# Patient Record
Sex: FEMALE | Race: WHITE | NOT HISPANIC OR LATINO | Employment: OTHER | URBAN - METROPOLITAN AREA
[De-identification: names, ages, dates, MRNs, and addresses within clinical notes are randomized per-mention and may not be internally consistent; named-entity substitution may affect disease eponyms.]

---

## 2021-08-08 ENCOUNTER — OFFICE VISIT (OUTPATIENT)
Dept: URGENT CARE | Facility: URGENT CARE | Age: 76
End: 2021-08-08
Payer: MEDICARE

## 2021-08-08 ENCOUNTER — ANCILLARY PROCEDURE (OUTPATIENT)
Dept: GENERAL RADIOLOGY | Facility: CLINIC | Age: 76
End: 2021-08-08
Attending: FAMILY MEDICINE
Payer: MEDICARE

## 2021-08-08 VITALS
DIASTOLIC BLOOD PRESSURE: 94 MMHG | HEART RATE: 114 BPM | TEMPERATURE: 96.6 F | OXYGEN SATURATION: 96 % | RESPIRATION RATE: 16 BRPM | SYSTOLIC BLOOD PRESSURE: 158 MMHG

## 2021-08-08 DIAGNOSIS — R05.9 COUGH: ICD-10-CM

## 2021-08-08 DIAGNOSIS — J18.9 PNEUMONIA OF LEFT LOWER LOBE DUE TO INFECTIOUS ORGANISM: Primary | ICD-10-CM

## 2021-08-08 DIAGNOSIS — R06.02 SOB (SHORTNESS OF BREATH): ICD-10-CM

## 2021-08-08 DIAGNOSIS — Z20.822 SUSPECTED COVID-19 VIRUS INFECTION: ICD-10-CM

## 2021-08-08 LAB — SARS-COV-2 RNA RESP QL NAA+PROBE: POSITIVE

## 2021-08-08 PROCEDURE — 99204 OFFICE O/P NEW MOD 45 MIN: CPT | Performed by: FAMILY MEDICINE

## 2021-08-08 PROCEDURE — 71046 X-RAY EXAM CHEST 2 VIEWS: CPT | Performed by: RADIOLOGY

## 2021-08-08 PROCEDURE — U0005 INFEC AGEN DETEC AMPLI PROBE: HCPCS | Performed by: FAMILY MEDICINE

## 2021-08-08 PROCEDURE — U0003 INFECTIOUS AGENT DETECTION BY NUCLEIC ACID (DNA OR RNA); SEVERE ACUTE RESPIRATORY SYNDROME CORONAVIRUS 2 (SARS-COV-2) (CORONAVIRUS DISEASE [COVID-19]), AMPLIFIED PROBE TECHNIQUE, MAKING USE OF HIGH THROUGHPUT TECHNOLOGIES AS DESCRIBED BY CMS-2020-01-R: HCPCS | Performed by: FAMILY MEDICINE

## 2021-08-08 RX ORDER — SERTRALINE HYDROCHLORIDE 25 MG/1
TABLET, FILM COATED ORAL
COMMUNITY
Start: 2020-10-27

## 2021-08-08 RX ORDER — CHLORTHALIDONE 25 MG/1
TABLET ORAL
COMMUNITY
Start: 2020-09-29

## 2021-08-08 RX ORDER — SIMVASTATIN 20 MG
TABLET ORAL
COMMUNITY
Start: 2021-01-25

## 2021-08-08 RX ORDER — AMOXICILLIN 875 MG
875 TABLET ORAL 2 TIMES DAILY
Qty: 20 TABLET | Refills: 0 | Status: SHIPPED | OUTPATIENT
Start: 2021-08-08 | End: 2021-08-18

## 2021-08-08 RX ORDER — DOXYCYCLINE 100 MG/1
100 TABLET ORAL 2 TIMES DAILY
Qty: 20 TABLET | Refills: 0 | Status: SHIPPED | OUTPATIENT
Start: 2021-08-08 | End: 2021-08-18

## 2021-08-08 RX ORDER — PANTOPRAZOLE SODIUM 40 MG/1
TABLET, DELAYED RELEASE ORAL
COMMUNITY
Start: 2021-02-22

## 2021-08-08 NOTE — PROGRESS NOTES
SUBJECTIVE: Cat Shea is a 76 year old female presenting with a chief complaint of fever, cough  and sweats with fatigue and sob.  Onset of symptoms was 1 week(s) ago.  Course of illness is worsening.    Predisposing factors include pt is immunized for COVID.    No past medical history on file.  Allergies   Allergen Reactions     Codeine      Other reaction(s): GI intolerance     Levofloxacin Swelling     Morphine      Other reaction(s): GI intolerance     Social History     Tobacco Use     Smoking status: Never Smoker     Smokeless tobacco: Never Used   Substance Use Topics     Alcohol use: Not on file       ROS:  SKIN: no rash  GI: no vomiting    OBJECTIVE:  BP (!) 158/94   Pulse 114   Temp (!) 96.6  F (35.9  C) (Tympanic)   Resp 16   SpO2 96% GENERAL APPEARANCE: healthy, alert and no distress  EYES: EOMI,  PERRL, conjunctiva clear  HENT: ear canals and TM's normal.  Nose and mouth without ulcers, erythema or lesions  RESP: rhonchi L lower posterior  CV: regular rates and rhythm, normal S1 S2, no murmur noted  SKIN: no suspicious lesions or rashes    Xray without acute findings, no pneumonia seen on cxr read by Nirav Carlson D.O.      ICD-10-CM    1. Pneumonia of left lower lobe due to infectious organism  J18.9 doxycycline monohydrate (ADOXA) 100 MG tablet     amoxicillin (AMOXIL) 875 MG tablet   2. Cough  R05 XR Chest 2 Views   3. Suspected COVID-19 virus infection  Z20.822 Symptomatic COVID-19 Virus (Coronavirus) by PCR Nasopharyngeal     XR Chest 2 Views   4. SOB (shortness of breath)  R06.02      Quarantine  PPE used  No agp  Pt masked  Fluids/Rest, f/u if worse/not any better

## 2021-08-09 ENCOUNTER — TELEPHONE (OUTPATIENT)
Dept: URGENT CARE | Facility: URGENT CARE | Age: 76
End: 2021-08-09

## 2021-08-09 NOTE — TELEPHONE ENCOUNTER
"-Coronavirus (COVID-19) Notification    Caller Name (Patient, parent, daughter/son, grandparent, etc)Patient  Patient    Reason for call  Notify of Positive Coronavirus (COVID-19) lab results, assess symptoms,  review [facility] recommendations    Lab Result    Lab test:  2019-nCoV rRt-PCR or SARS-CoV-2 PCR    Oropharyngeal AND/OR nasopharyngeal swabs is POSITIVE for 2019-nCoV RNA/SARS-COV-2 PCR (COVID-19 virus)    RN Recommendations/Instructions per [facility] Coronavirus COVID-19 recommendations    Brief introduction script  Introduce self then review script:  \"I am calling on behalf of [employer].  We were notified that your Coronavirus test (COVID-19) for was POSITIVE for the virus.  I have some information to relay to you but first I wanted to mention that the [place] Dept of Health will be contacting you shortly [it's possible MD already called Patient] to talk to you more about how you are feeling and other people you have had contact with who might now also have the virus.  Also, [facility] is Partnering with the [facility] for Covid-19 research, you may be contacted directly by research staff.\"    Assessment (Inquire about Patient's current symptoms)   Assessment   Current Symptoms at time of phone call: (if no symptoms, document No symptoms] Cough, congestion, nausea   8/1/21 8/1/21     If at time of call, Patients symptoms hare worsened, the Patient should contact 911 or have someone drive them to Emergency Dept promptly:      If Patient calling 911, inform 911 personal that you have tested positive for the Coronavirus (COVID-19).  Place mask on and await 911 to arrive.    If Emergency Dept, If possible, please have another adult drive you to the Emergency Dept but you need to wear mask when in contact with other people.      Monoclonal Antibody Administration    You may be eligible to receive a new treatment with a monoclonal antibody for preventing hospitalization in " "patients at high risk for complications from COVID-19.   This medication is still experimental and available on a limited basis; it is given through an IV and must be given at an infusion center. Please note that not all people who are eligible will receive the medication since it is in limited supply.     Are you interested in being considered for this medication?  Yes.   Is the patient symptomatic?  Yes. Is the patient 18 years of age or older? Yes.  Is the patient newly (within the last week) on supplemental oxygen or requiring more oxygen than usual?  No. Patient criteria for selection: Is the patients weight equal to or greater than 40 kg (88 lbs)? Yes.  Is the patient's age 65 years or older?  Yes.  Patient qualifies, refer patient to [facility].   Does the patient fit the criteria: Yes: Patient referred [place], patient or family/friend will complete application.    If patient qualifies based on above criteria:  \"You will be contacted if you are selected to receive this treatment in the next 1-2 business days.   This is time sensitive and if you are not selected in the next 1-2 business days, you will not receive the medication.  If you do not receive a call to schedule, you have not been selected.\"      Review information with Patient    Your result was positive. This means you have COVID-19 (coronavirus).  We have sent you a letter that reviews the information that I'll be reviewing with you now.    How can I protect others?    If you have symptoms: stay home and away from others (self-isolate) until:    You've had no fever--and no medicine that reduces fever--for 1 full day (24 hours). And       Your other symptoms have gotten better. For example, your cough or breathing has improved. And     At least 10 days have passed since your symptoms started. (If you've been told by a doctor that you have a weak immune system, wait 20 days.)     If you don't have symptoms: Stay home and away from others " (self-isolate) until at least 10 days have passed since your first positive COVID-19 test. (Date test collected)    During this time:    Stay in your own room, including for meals. Use your own bathroom if you can.    Stay away from others in your home. No hugging, kissing or shaking hands. No visitors.     Don't go to work, school or anywhere else.     Clean  high touch  surfaces often (doorknobs, counters, handles, etc.). Use a household cleaning spray or wipes. You'll find a full list on the EPA website at www.epa.gov/pesticide-registration/list-n-disinfectants-use-against-sars-cov-2.     Cover your mouth and nose with a mask, tissue or other face covering to avoid spreading germs.    Wash your hands and face often with soap and water.    Make a list of people you have been in close contact with recently, even if either of you wore a face covering.   ; Start your list from 2 days before you became ill or had a positive test.  ; Include anyone that was within 6 feet of you for a cumulative total of 15 minutes or more in 24 hours. (Example: if you sat next to Ricardo for 5 minutes in the morning and 10 minutes in the afternoon, then you were in close contact for 15 minutes total that day. Ricardo would be added to your list.)    A public health worker will call or text you. It is important that you answer. They will ask you questions about possible exposures to COVID-19, such as people you have been in direct contact with and places you have visited.    Tell the people on your list that you have COVID-19; they should stay away from others for 14 days starting from the last time they were in contact with you (unless you are told something different from a public health worker).     Caregivers in these groups are at risk for severe illness due to COVID-19:  o People 65 years and older  o People who live in a nursing home or long-term care facility  o People with chronic disease (lung, heart, cancer, diabetes, kidney,  liver, immunologic)  o People who have a weakened immune system, including those who:  - Are in cancer treatment  - Take medicine that weakens the immune system, such as corticosteroids  - Had a bone marrow or organ transplant  - Have an immune deficiency  - Have poorly controlled HIV or AIDS  - Are obese (body mass index of 40 or higher)  - Smoke regularly    Caregivers should wear gloves while washing dishes, handling laundry and cleaning bedrooms and bathrooms.    Wash and dry laundry with special caution. Don't shake dirty laundry, and use the warmest water setting you can.    If you have a weakened immune system, ask your doctor about other actions you should take.    For more tips, go to www.cdc.gov/coronavirus/2019-ncov/downloads/10Things.pdf.    You should not go back to work until you meet the guidelines above for ending your home isolation. You don't need to be retested for COVID-19 before going back to work--studies show that you won't spread the virus if it's been at least 10 days since your symptoms started (or 20 days, if you have a weak immune system).    Employers: This document serves as formal notice of your employee's medical guidelines for going back to work. They must meet the above guidelines before going back to work in person.    How can I take care of myself?    1. Get lots of rest. Drink extra fluids (unless a doctor has told you not to).    2. Take Tylenol (acetaminophen) for fever or pain. If you have liver or kidney problems, ask your family doctor if it's okay to take Tylenol.     Take either:     650 mg (two 325 mg pills) every 4 to 6 hours, or     1,000 mg (two 500 mg pills) every 8 hours as needed.     Note: Don't take more than 3,000 mg in one day. Acetaminophen is found in many medicines (both prescribed and over-the-counter medicines). Read all labels to be sure you don't take too much.    For children, check the Tylenol bottle for the right dose (based on their age or  weight).    3. If you have other health problems (like cancer, heart failure, an organ transplant or severe kidney disease): Call your specialty clinic if you don't feel better in the next 2 days.    4. Know when to call 911: Emergency warning signs include:    Trouble breathing or shortness of breath    Pain or pressure in the chest that doesn't go away    Feeling confused like you haven't felt before, or not being able to wake up    Bluish-colored lips or face    5. Sign up for [employer]. We know it's scary to hear that you have COVID-19. We want to track your symptoms to make sure you're okay over the next 2 weeks. Please look for an email from [employer]--this is a free, online program that we'll use to keep in touch. To sign up, follow the link in the email. Learn more at [email or web address].    Where can I get more information?    [facility]: [email or web address]    Coronavirus Basics: www.health.Count includes the [facility].[place]./diseases/coronavirus/basics.html    What to Do If You're Sick: www.cdc.gov/coronavirus/2019-ncov/about/steps-when-sick.html    Ending Home Isolation: www.cdc.gov/coronavirus/2019-ncov/hcp/disposition-in-home-patients.html     Caring for Someone with COVID-19: www.cdc.gov/coronavirus/2019-ncov/if-you-are-sick/care-for-someone.html     [facility] clinical trials (COVID-19 research studies): clinicalaffairs.[facility]/n-clinical-trials     A Positive COVID-19 letter will be sent via [employer] or the mail. (Exception, no letters sent to Presurgerical/Preprocedure Patients)    Garima Herrera LPN

## 2021-08-12 ENCOUNTER — NURSE TRIAGE (OUTPATIENT)
Dept: NURSING | Facility: CLINIC | Age: 76
End: 2021-08-12

## 2021-08-12 NOTE — TELEPHONE ENCOUNTER
Cat is calling and has questions.  Cat is on day 5 of two antibiotics.  On 8/8/2021 diagnosed with pneumonia.  Patient is taking Amoxicillin and Doxycycline.  Positive test for covid on 8/8/2021.  Today patient has questions on medications and quarantine.    COVID 19 Nurse Triage Plan/Patient Instructions    Please be aware that novel coronavirus (COVID-19) may be circulating in the community. If you develop symptoms such as fever, cough, or SOB or if you have concerns about the presence of another infection including coronavirus (COVID-19), please contact your health care provider or visit [email or web address].     Disposition/Instructions    Home care recommended. Follow home care protocol based instructions.    Thank you for taking steps to prevent the spread of this virus.  o Limit your contact with others.  o Wear a simple mask to cover your cough.  o Wash your hands well and often.    Resources    [facility]: About COVID-19: [email or web address]    CDC: What to Do If You're Sick: www.cdc.gov/coronavirus/2019-ncov/about/steps-when-sick.html    CDC: Ending Home Isolation: www.cdc.gov/coronavirus/2019-ncov/hcp/disposition-in-home-patients.html     CDC: Caring for Someone: www.cdc.gov/coronavirus/2019-ncov/if-you-are-sick/care-for-someone.html     [facility]: Interim Guidance for Hospital Discharge to Home: www.health.[facility].[place]/diseases/coronavirus/hcp/hospdischarge.pdf    [facility] clinical trials (COVID-19 research studies): clinicalaffairs.[facility]-clinical-trials     Below are the COVID-19 hotlines at the [place] Department of Health ([facility]). Interpreters are available.   o For health questions: Call 158-415-6754 or 1-657.364.6598 (7 a.m. to 7 p.m.)  o For questions about schools and childcare: Call 105-284-4127 or 1-706.685.9475 (7 a.m. to 7 p.m.)                     Additional Information    Negative: MORE THAN A DOUBLE DOSE of a prescription or over-the-counter  (OTC) drug    Negative: DOUBLE DOSE (an extra dose or lesser amount) of over-the-counter (OTC) drug and any symptoms (e.g., dizziness, nausea, pain, sleepiness)    Negative: DOUBLE DOSE (an extra dose or lesser amount) of prescription drug and any symptoms (e.g., dizziness, nausea, pain, sleepiness)    Negative: Took another person's prescription drug    Negative: DOUBLE DOSE (an extra dose or lesser amount) of prescription drug and NO symptoms (Exception: a double dose of antibiotics)    Negative: Diabetes drug error or overdose (e.g., took wrong type of insulin or took extra dose)    Negative: Caller has medication question about med not prescribed by PCP and triager unable to answer question (e.g., compatibility with other med, storage)    Negative: Request for URGENT new prescription or refill of 'essential' medication (i.e., likelihood of harm to patient if not taken) and triager unable to fill per department policy    Negative: Prescription not at pharmacy and was prescribed today by PCP    Negative: Pharmacy calling with prescription questions and triager unable to answer question    Negative: Caller has urgent medication question about med that PCP prescribed and triager unable to answer question    Negative: Caller has NON-URGENT medication question about med that PCP prescribed and triager unable to answer question    Negative: Caller requesting a NON-URGENT new prescription or refill and triager unable to refill per department policy    Negative: DOUBLE DOSE (an extra dose or lesser amount) of over-the-counter (OTC) drug and NO symptoms    Negative: DOUBLE DOSE (an extra dose or lesser amount) of antibiotic drug and NO symptoms    Negative: Caller has medication question only, adult not sick, and triager answers question    Caller has medication question, adult has minor symptoms, caller declines triage, and triager answers question    Protocols used: MEDICATION QUESTION CALL-A-[place]

## 2021-08-15 ENCOUNTER — NURSE TRIAGE (OUTPATIENT)
Dept: NURSING | Facility: CLINIC | Age: 76
End: 2021-08-15

## 2021-08-15 NOTE — TELEPHONE ENCOUNTER
Pt calling that yesterday she ending her quarantine for COVID.  Pt states yesterday her  tested positive for COVID.  Pt asking for Care Advice on if there are any limitations for her for going out and about and doing her normal activities.   Health Education provided.  Shital Ramos RN, [facility] Nurse Advisor      Reason for Disposition    COVID-19 Prevention and Healthy Living, questions about    Protocols used: CORONAVIRUS (COVID-19) DIAGNOSED OR [id number] 3.25

## 2021-08-21 ENCOUNTER — OFFICE VISIT (OUTPATIENT)
Dept: URGENT CARE | Facility: URGENT CARE | Age: 76
End: 2021-08-21
Payer: MEDICARE

## 2021-08-21 ENCOUNTER — ANCILLARY PROCEDURE (OUTPATIENT)
Dept: GENERAL RADIOLOGY | Facility: CLINIC | Age: 76
End: 2021-08-21
Attending: PHYSICIAN ASSISTANT
Payer: MEDICARE

## 2021-08-21 VITALS
TEMPERATURE: 97.3 F | HEART RATE: 72 BPM | RESPIRATION RATE: 22 BRPM | OXYGEN SATURATION: 96 % | SYSTOLIC BLOOD PRESSURE: 155 MMHG | DIASTOLIC BLOOD PRESSURE: 82 MMHG

## 2021-08-21 DIAGNOSIS — R09.82 POST-NASAL DRIP: ICD-10-CM

## 2021-08-21 DIAGNOSIS — R05.9 COUGH: ICD-10-CM

## 2021-08-21 DIAGNOSIS — U07.1 2019 NOVEL CORONAVIRUS DISEASE (COVID-19): Primary | ICD-10-CM

## 2021-08-21 DIAGNOSIS — R11.0 NAUSEA: ICD-10-CM

## 2021-08-21 PROCEDURE — 71046 X-RAY EXAM CHEST 2 VIEWS: CPT | Performed by: RADIOLOGY

## 2021-08-21 PROCEDURE — 99214 OFFICE O/P EST MOD 30 MIN: CPT | Performed by: PHYSICIAN ASSISTANT

## 2021-08-21 RX ORDER — ONDANSETRON 4 MG/1
4 TABLET, ORALLY DISINTEGRATING ORAL EVERY 8 HOURS PRN
Qty: 15 TABLET | Refills: 0 | Status: SHIPPED | OUTPATIENT
Start: 2021-08-21

## 2021-08-21 RX ORDER — CODEINE PHOSPHATE AND GUAIFENESIN 10; 100 MG/5ML; MG/5ML
1-2 SOLUTION ORAL EVERY 4 HOURS PRN
Qty: 118 ML | Refills: 0 | Status: SHIPPED | OUTPATIENT
Start: 2021-08-21 | End: 2022-12-30

## 2021-08-21 RX ORDER — ALBUTEROL SULFATE 90 UG/1
2 AEROSOL, METERED RESPIRATORY (INHALATION) EVERY 6 HOURS
Qty: 18 G | Refills: 0 | Status: SHIPPED | OUTPATIENT
Start: 2021-08-21

## 2021-08-21 RX ORDER — LORATADINE 10 MG/1
10 TABLET ORAL DAILY
Qty: 30 TABLET | Refills: 1 | Status: SHIPPED | OUTPATIENT
Start: 2021-08-21

## 2021-08-21 NOTE — PROGRESS NOTES
No chief complaint on file.    (U07.1) 2019 novel coronavirus disease (COVID-19)  (primary encounter diagnosis)  Comment:   Plan: XR Chest 2 Views        Handout on Covid given and reviewed.  TO ED should symptoms worsen in any way    (R05) Cough  Comment:   Plan: XR Chest 2 Views, guaiFENesin-codeine         (ROBITUSSIN AC) 100-10 MG/5ML solution,         albuterol (PROAIR HFA/PROVENTIL HFA/VENTOLIN         HFA) 108 (90 Base) MCG/ACT inhaler            (R09.82) Post-nasal drip  Comment:   Plan: loratadine (CLARITIN) 10 MG tablet            (R11.0) Nausea  Comment:   Plan: ondansetron (ZOFRAN-ODT) 4 MG ODT tab        Take prior to taking cough medication to help with nausea, take 30 minutes prior          Immunization History   Administered Date(s) Administered     COVID-19,PF,Pfizer 02/09/2021, 03/02/2021           SUBJECTIVE:   Cat Shea is a 76 year old female who presents today with a persistent cough, was seen in  on 8/8/21, diagnosed with possible pneumonia and treated with Amoxicillin and Doxycycline.  Covid test was positive.        Felt better after finishing tahir antibiotics, but the cough seemed to recur after that.  Occasionally the cough is productive.  No fevers.  Occasional shortness of breath.  No chest pain.    Her  is hospitalized with Covid for the past 3 days, now with pneumonia, he is 79  He was started on remdesivir    SH: recently moved to [facility] from [place], her daughter lives in the area here.      Current Outpatient Medications   Medication Sig Dispense Refill     Multiple Vitamins-Iron (DAILY-BARON/IRON/BETA-CAROTENE) TABS TAKE 1 TABLET BY MOUTH DAILY. (Patient not taking: Reported on 10/19/2020) 30 tablet 7     Social History     Tobacco Use     Smoking status: Never Smoker     Smokeless tobacco: Never Used   Substance Use Topics     Alcohol use: Not on file     Family History   Problem Relation Age of Onset     Diabetes Mother      Diabetes Father          ROS:    10 point  ROS of systems including Constitutional, Eyes, Respiratory, Cardiovascular, Gastroenterology, Genitourinary, Integumentary, Muscularskeletal, Psychiatric ,neurological were all negative except for pertinent positives noted in my HPI       OBJECTIVE:  BP (!) 155/82   Pulse 72   Temp 97.3  F (36.3  C) (Tympanic)   Resp 22   SpO2 96%   Physical Exam:  GENERAL APPEARANCE: healthy, alert and no distress  EYES: EOMI,  PERRL, conjunctiva clear  HENT: ear canals and TM's normal.  Nose and mouth without ulcers, erythema or lesions  NECK: supple, nontender, no lymphadenopathy  RESP: lungs clear to auscultation - no rales, rhonchi or wheezes  CV: regular rates and rhythm, normal S1 S2, no murmur noted  NEURO: Normal strength and tone, sensory exam grossly normal,  normal speech and mentation  SKIN: no suspicious lesions or rashes    X-Ray was done, my findings are: no infiltrates or masses

## 2021-08-21 NOTE — PATIENT INSTRUCTIONS
"(U07.1) 2019 novel coronavirus disease (COVID-19)  (primary encounter diagnosis)  Comment:   Plan: XR Chest 2 Views            (R05) Cough  Comment:   Plan: XR Chest 2 Views, guaiFENesin-codeine         (ROBITUSSIN AC) 100-10 MG/5ML solution,         albuterol (PROAIR HFA/PROVENTIL HFA/VENTOLIN         HFA) 108 (90 Base) MCG/ACT inhaler            (R09.82) Post-nasal drip  Comment:   Plan: loratadine (CLARITIN) 10 MG tablet            (R11.0) Nausea  Comment:   Plan: ondansetron (ZOFRAN-ODT) 4 MG ODT tab        Take prior to taking cough medication to help with nausea, take 30 minutes prior      Patient Education   Discharge Instructions for COVID-19 Patients  You have--or may have--COVID-19. Please follow the instructions listed below.   If you have a weakened immune system, discuss with your doctor any other actions you need to take.  How can I protect others?  If you have symptoms (fever, cough, body aches or trouble breathing):    Stay home and away from others (self-isolate) until:  ? Your other symptoms have resolved (gotten better). And   ? You've had no fever--and no medicine that reduces fever--for 1 full day (24 hours). And   ? At least 10 days have passed since your symptoms started. (You may need to wait 20 days. Follow the advice of your care team.)  If you don't show symptoms, but testing showed that you have COVID-19:    Stay home and away from others (self-isolate) until at least 10 days have passed since the date of your first positive COVID-19 test.  During this time    Stay in your own room, even for meals. Use your own bathroom if you can.    Stay away from others in your home. No hugging, kissing or shaking hands. No visitors.    Don't go to work, school or anywhere else.    Clean \"high touch\" surfaces often (doorknobs, counters, handles). Use household cleaning spray or wipes.    You'll find a full list of  on the EPA website: " www.epa.gov/pesticide-registration/list-n-disinfectants-use-against-sars-cov-2.    Cover your mouth and nose with a mask or other face covering to avoid spreading germs.    Wash your hands and face often. Use soap and water.    Caregivers in these groups are at risk for severe illness due to COVID-19:  ? People 65 years and older  ? People who live in a nursing home or long-term care facility  ? People with chronic disease (lung, heart, cancer, diabetes, kidney, liver, immunologic)  ? People who have a weakened immune system, including those who:    Are in cancer treatment    Take medicine that weakens the immune system, such as corticosteroids    Had a bone marrow or organ transplant    Have an immune deficiency    Have poorly controlled HIV or AIDS    Are obese (body mass index of 40 or higher)    Smoke regularly    Caregivers should wear gloves while washing dishes, handling laundry and cleaning bedrooms and bathrooms.    Use caution when washing and drying laundry: Don't shake dirty laundry and use the warmest water setting that you can.    For more tips on managing your health at home, go to www.cdc.gov/coronavirus/2019-ncov/downloads/10Things.pdf.  How can I take care of myself at home?  1. Get lots of rest. Drink extra fluids (unless a doctor has told you not to).  2. Take Tylenol (acetaminophen) for fever or pain. If you have liver or kidney problems, ask your family doctor if it's okay to take Tylenol.   Adults can take either:   ? 650 mg (two 325 mg pills) every 4 to 6 hours, or   ? 1,000 mg (two 500 mg pills) every 8 hours as needed.  ? Note: Don't take more than 3,000 mg in one day. Acetaminophen is found in many medicines (both prescribed and over-the-counter medicines). Read all labels to be sure you don't take too much.   For children, check the Tylenol bottle for the right dose. The dose is based on the child's age or weight.  3. If you have other health problems (like cancer, heart failure, an organ  transplant or severe kidney disease): Call your specialty clinic if you don't feel better in the next 2 days.  4. Know when to call 911. Emergency warning signs include:  ? Trouble breathing or shortness of breath  ? Pain or pressure in the chest that doesn't go away  ? Feeling confused like you haven't felt before, or not being able to wake up  ? Bluish-colored lips or face  5. Your doctor may have prescribed a blood thinner medicine. Follow their instructions.  Where can I get more information?    [facility] - About COVID-19:   [email or web address]    CDC - What to Do If You're Sick: www.cdc.gov/coronavirus/2019-ncov/about/steps-when-sick.html    CDC - Ending Home Isolation: www.cdc.gov/coronavirus/2019-ncov/hcp/disposition-in-home-patients.html    [facility] - Caring for Someone: www.cdc.gov/coronavirus/2019-ncov/if-you-are-sick/care-for-someone.html    [facility] - Interim Guidance for Hospital Discharge to Home: www.[facility].[place]/diseases/coronavirus/hcp/hospdischarge.pdf    Below are the COVID-19 hotlines at the [facility] of Health ([facility]). Interpreters are available.  ? For health questions: Call 930-219-3493 or 1-576.634.9133 (7 a.m. to 7 p.m.)  ? For questions about schools and childcare: Call 530-539-3614 or 1-640.101.4700 (7 a.m. to 7 p.m.)    For informational purposes only. Not to replace the advice of your health care provider. Clinically reviewed by Dr. Nash Freire.   Copyright   2020 [employer]. All rights reserved. [employer] [id number] - REV 01/05/21.

## 2021-08-22 ENCOUNTER — HEALTH MAINTENANCE LETTER (OUTPATIENT)
Age: 76
End: 2021-08-22

## 2021-08-24 ENCOUNTER — NURSE TRIAGE (OUTPATIENT)
Dept: NURSING | Facility: CLINIC | Age: 76
End: 2021-08-24

## 2021-08-24 NOTE — TELEPHONE ENCOUNTER
Calling in with burning, itching and redness in the vaginal area. NO discharge or odor. She was treated with antibiotics for 10 days on 8/8/21 for a bacterial infection with COVID19.     Asking for treatment for her yeast infection. Home cares per RN protocol reviewed with patient in detail She was asked to call back if symptoms persisted after home care treatment for 3 days or if new or worsening symptoms appear.     COVID 19 Nurse Triage Plan/Patient Instructions    Please be aware that novel coronavirus (COVID-19) may be circulating in the community. If you develop symptoms such as fever, cough, or SOB or if you have concerns about the presence of another infection including coronavirus (COVID-19), please contact your health care provider or visit [email or web address].     Disposition/Instructions    Home care recommended. Follow home care protocol based instructions.    Thank you for taking steps to prevent the spread of this virus.  o Limit your contact with others.  o Wear a simple mask to cover your cough.  o Wash your hands well and often.    Resources    [facility]: About COVID-19: [email or web address]    CDC: What to Do If You're Sick: www.cdc.gov/coronavirus/2019-ncov/about/steps-when-sick.html    CDC: Ending Home Isolation: www.cdc.gov/coronavirus/2019-ncov/hcp/disposition-in-home-patients.html     CDC: Caring for Someone: www.cdc.gov/coronavirus/2019-ncov/if-you-are-sick/care-for-someone.html     [facility]: Interim Guidance for Hospital Discharge to Home: www.health.[facility].[place]/diseases/coronavirus/hcp/hospdischarge.pdf    [facility] clinical trials (COVID-19 research studies): clinicalaffairs.[facility]/n-clinical-trials     Below are the COVID-19 hotlines at the [place] [facility] ([facility]). Interpreters are available.   o For health questions: Call 642-707-3445 or 1-476.238.3886 (7 a.m. to 7 p.m.)  o For questions about schools and childcare: Call 187-689-6766  or [phone] (7 a.m. to 7 p.m.)                   Nayla Martínez RN on 8/24/2021 at 8:08 AM      Additional Information    Negative: Pain or burning with passing urine (urination) is main symptom    Negative: Vaginal discharge is the main symptom    Negative: Pubic lice suspected    Negative: STD exposure and prevention, question about    Negative: Patient sounds very sick or weak to the triager    Negative: SEVERE pain (e.g., excruciating)    Negative: Genital area looks infected (e.g., draining sore, spreading redness)    Negative: Rash with painful tiny water blisters    Negative: Patient wants to be seen    Negative: Vulvar itching and not improved > 3 days following Care Advice    Negative: Tender lump (swelling or 'ball') at vaginal opening    Negative: Rash (e.g., redness, tiny bumps, sore) of genital area present > 24 hours    Negative: MODERATE-SEVERE itching (i.e., interferes with school, work, or sleep)    Negative: Symptoms of a 'yeast infection' (i.e., itchy, white discharge, not bad smelling) and not improved > 3 days following CARE ADVICE    Negative: Patient is worried about a sexually transmitted disease (STD)    Symptoms of a 'yeast infection' (i.e., itchy, white discharge, not bad smelling), which feels like prior vaginal yeast infections    Negative: Itching or dryness of genital area and nearing menopause or after menopause    Negative: Pain in genital area is a chronic symptom (recurrent or ongoing AND lasting > 4 weeks)    Negative: ALL other vulvar symptoms (Exception: feels like prior yeast infection, or rash < 24 hour duration)    Protocols used: VULVAR SYMPTOMS-A-OH

## 2021-10-17 ENCOUNTER — HEALTH MAINTENANCE LETTER (OUTPATIENT)
Age: 76
End: 2021-10-17

## 2022-10-03 ENCOUNTER — HEALTH MAINTENANCE LETTER (OUTPATIENT)
Age: 77
End: 2022-10-03

## 2022-12-15 ENCOUNTER — OFFICE VISIT (OUTPATIENT)
Dept: URGENT CARE | Facility: URGENT CARE | Age: 77
End: 2022-12-15
Payer: MEDICARE

## 2022-12-15 VITALS
SYSTOLIC BLOOD PRESSURE: 142 MMHG | OXYGEN SATURATION: 96 % | TEMPERATURE: 98.1 F | WEIGHT: 190 LBS | DIASTOLIC BLOOD PRESSURE: 84 MMHG | RESPIRATION RATE: 20 BRPM | HEART RATE: 93 BPM

## 2022-12-15 DIAGNOSIS — J01.90 ACUTE SINUSITIS WITH SYMPTOMS > 10 DAYS: Primary | ICD-10-CM

## 2022-12-15 PROCEDURE — 99213 OFFICE O/P EST LOW 20 MIN: CPT | Performed by: FAMILY MEDICINE

## 2022-12-15 NOTE — PROGRESS NOTES
SUBJECTIVE: Cat Shea is a 77 year old female here with concerns about sinus infection.  She states onset  of symptoms were greater than 10 days with sinus pressure and drainage.  Course of illness is worsening.    Severity: moderate  Current and Associated symptoms: cold symptoms  Predisposing factors include none.   Recent treatment has included: OTC's  Recent COVID    No past medical history on file.  Allergies   Allergen Reactions     Codeine      Other reaction(s): GI intolerance     Morphine      Other reaction(s): GI intolerance  Other reaction(s): GI Upset  Other reaction(s): GI intolerance     Levofloxacin Swelling and Rash     Social History     Tobacco Use     Smoking status: Never     Smokeless tobacco: Never   Substance Use Topics     Alcohol use: Not on file       ROS:   no rash  no vomiting    OBJECTIVE:  BP (!) 142/84   Pulse 93   Temp 98.1  F (36.7  C)   Resp 20   Wt 86.2 kg (190 lb)   SpO2 96%   NAD  EYES: clear, no mattering  EARS: TM's clear, no redness/buldging, canals clear, no swelling/redness  NOSE: discolored discharge ozzy. Nares  THROAT: clear, no erythema, no exudate  SINUS: maxillary tenderness with palpation  LUNGS: CTAB, no rhonchi/wheeze/rales      ICD-10-CM    1. Acute sinusitis with symptoms > 10 days  J01.90 amoxicillin-clavulanate (AUGMENTIN) 875-125 MG tablet          Follow up with primary clinic if not improving

## 2022-12-29 ENCOUNTER — OFFICE VISIT (OUTPATIENT)
Dept: URGENT CARE | Facility: URGENT CARE | Age: 77
End: 2022-12-29
Payer: MEDICARE

## 2022-12-29 ENCOUNTER — ANCILLARY PROCEDURE (OUTPATIENT)
Dept: GENERAL RADIOLOGY | Facility: CLINIC | Age: 77
End: 2022-12-29
Attending: PHYSICIAN ASSISTANT
Payer: MEDICARE

## 2022-12-29 VITALS
OXYGEN SATURATION: 98 % | DIASTOLIC BLOOD PRESSURE: 78 MMHG | TEMPERATURE: 99 F | SYSTOLIC BLOOD PRESSURE: 156 MMHG | HEART RATE: 103 BPM

## 2022-12-29 DIAGNOSIS — R07.89 ATYPICAL CHEST PAIN: ICD-10-CM

## 2022-12-29 DIAGNOSIS — R05.9 COUGH, UNSPECIFIED TYPE: ICD-10-CM

## 2022-12-29 DIAGNOSIS — R05.3 COVID-19 LONG HAULER MANIFESTING CHRONIC COUGH: ICD-10-CM

## 2022-12-29 DIAGNOSIS — R05.3 CHRONIC COUGHING: ICD-10-CM

## 2022-12-29 DIAGNOSIS — R11.0 NAUSEA: ICD-10-CM

## 2022-12-29 DIAGNOSIS — U09.9 COVID-19 LONG HAULER MANIFESTING CHRONIC COUGH: ICD-10-CM

## 2022-12-29 DIAGNOSIS — R07.89 ATYPICAL CHEST PAIN: Primary | ICD-10-CM

## 2022-12-29 LAB
ALBUMIN SERPL-MCNC: 3.8 G/DL (ref 3.4–5)
ALP SERPL-CCNC: 100 U/L (ref 40–150)
ALT SERPL W P-5'-P-CCNC: 28 U/L (ref 0–50)
ANION GAP SERPL CALCULATED.3IONS-SCNC: 7 MMOL/L (ref 3–14)
AST SERPL W P-5'-P-CCNC: 30 U/L (ref 0–45)
BASOPHILS # BLD AUTO: 0 10E3/UL (ref 0–0.2)
BASOPHILS NFR BLD AUTO: 0 %
BILIRUB SERPL-MCNC: 0.8 MG/DL (ref 0.2–1.3)
BUN SERPL-MCNC: 17 MG/DL (ref 7–30)
CALCIUM SERPL-MCNC: 9.5 MG/DL (ref 8.5–10.1)
CHLORIDE BLD-SCNC: 103 MMOL/L (ref 94–109)
CO2 SERPL-SCNC: 31 MMOL/L (ref 20–32)
CREAT SERPL-MCNC: 1 MG/DL (ref 0.52–1.04)
EOSINOPHIL # BLD AUTO: 0.3 10E3/UL (ref 0–0.7)
EOSINOPHIL NFR BLD AUTO: 4 %
ERYTHROCYTE [DISTWIDTH] IN BLOOD BY AUTOMATED COUNT: 14.9 % (ref 10–15)
ERYTHROCYTE [SEDIMENTATION RATE] IN BLOOD BY WESTERGREN METHOD: 19 MM/HR (ref 0–30)
GFR SERPL CREATININE-BSD FRML MDRD: 57 ML/MIN/1.73M2
GLUCOSE BLD-MCNC: 175 MG/DL (ref 70–99)
HCT VFR BLD AUTO: 41.7 % (ref 35–47)
HGB BLD-MCNC: 13.5 G/DL (ref 11.7–15.7)
LYMPHOCYTES # BLD AUTO: 2.5 10E3/UL (ref 0.8–5.3)
LYMPHOCYTES NFR BLD AUTO: 33 %
MCH RBC QN AUTO: 30.5 PG (ref 26.5–33)
MCHC RBC AUTO-ENTMCNC: 32.4 G/DL (ref 31.5–36.5)
MCV RBC AUTO: 94 FL (ref 78–100)
MONOCYTES # BLD AUTO: 0.8 10E3/UL (ref 0–1.3)
MONOCYTES NFR BLD AUTO: 11 %
NEUTROPHILS # BLD AUTO: 4 10E3/UL (ref 1.6–8.3)
NEUTROPHILS NFR BLD AUTO: 52 %
PLATELET # BLD AUTO: 332 10E3/UL (ref 150–450)
POTASSIUM BLD-SCNC: 4.5 MMOL/L (ref 3.4–5.3)
PROT SERPL-MCNC: 7.7 G/DL (ref 6.8–8.8)
RBC # BLD AUTO: 4.42 10E6/UL (ref 3.8–5.2)
SODIUM SERPL-SCNC: 141 MMOL/L (ref 133–144)
TROPONIN I SERPL HS-MCNC: 5 NG/L
WBC # BLD AUTO: 7.7 10E3/UL (ref 4–11)

## 2022-12-29 PROCEDURE — 80053 COMPREHEN METABOLIC PANEL: CPT | Performed by: PHYSICIAN ASSISTANT

## 2022-12-29 PROCEDURE — 71046 X-RAY EXAM CHEST 2 VIEWS: CPT | Mod: TC | Performed by: RADIOLOGY

## 2022-12-29 PROCEDURE — 36415 COLL VENOUS BLD VENIPUNCTURE: CPT | Performed by: PHYSICIAN ASSISTANT

## 2022-12-29 PROCEDURE — 84484 ASSAY OF TROPONIN QUANT: CPT | Performed by: PHYSICIAN ASSISTANT

## 2022-12-29 PROCEDURE — 99214 OFFICE O/P EST MOD 30 MIN: CPT | Mod: CS | Performed by: PHYSICIAN ASSISTANT

## 2022-12-29 PROCEDURE — 85652 RBC SED RATE AUTOMATED: CPT | Performed by: PHYSICIAN ASSISTANT

## 2022-12-29 PROCEDURE — 85025 COMPLETE CBC W/AUTO DIFF WBC: CPT | Performed by: PHYSICIAN ASSISTANT

## 2022-12-29 PROCEDURE — 93000 ELECTROCARDIOGRAM COMPLETE: CPT | Performed by: PHYSICIAN ASSISTANT

## 2022-12-29 RX ORDER — BENZONATATE 200 MG/1
200 CAPSULE ORAL 3 TIMES DAILY PRN
Qty: 21 CAPSULE | Refills: 0 | Status: SHIPPED | OUTPATIENT
Start: 2022-12-29 | End: 2023-01-05

## 2022-12-29 RX ORDER — ONDANSETRON 4 MG/1
4 TABLET, ORALLY DISINTEGRATING ORAL EVERY 8 HOURS PRN
Qty: 12 TABLET | Refills: 0 | Status: SHIPPED | OUTPATIENT
Start: 2022-12-29

## 2022-12-29 RX ORDER — AZITHROMYCIN 250 MG/1
TABLET, FILM COATED ORAL
Qty: 6 TABLET | Refills: 0 | Status: SHIPPED | OUTPATIENT
Start: 2022-12-29 | End: 2023-01-03

## 2022-12-29 RX ORDER — ALBUTEROL SULFATE 90 UG/1
2 AEROSOL, METERED RESPIRATORY (INHALATION) EVERY 6 HOURS
Qty: 8.5 G | Refills: 0 | Status: SHIPPED | OUTPATIENT
Start: 2022-12-29

## 2022-12-29 RX ORDER — PREDNISONE 20 MG/1
TABLET ORAL
Qty: 14 TABLET | Refills: 0 | Status: SHIPPED | OUTPATIENT
Start: 2022-12-29

## 2022-12-30 ENCOUNTER — TELEPHONE (OUTPATIENT)
Dept: URGENT CARE | Facility: URGENT CARE | Age: 77
End: 2022-12-30

## 2022-12-30 DIAGNOSIS — R05.3 CHRONIC COUGH: ICD-10-CM

## 2022-12-30 RX ORDER — CODEINE PHOSPHATE AND GUAIFENESIN 10; 100 MG/5ML; MG/5ML
1-2 SOLUTION ORAL EVERY 4 HOURS PRN
Qty: 180 ML | Refills: 0 | Status: SHIPPED | OUTPATIENT
Start: 2022-12-30

## 2022-12-30 RX ORDER — CODEINE PHOSPHATE AND GUAIFENESIN 10; 100 MG/5ML; MG/5ML
1-2 SOLUTION ORAL EVERY 4 HOURS PRN
Qty: 180 ML | Refills: 0 | Status: SHIPPED | OUTPATIENT
Start: 2022-12-30 | End: 2022-12-30

## 2022-12-30 NOTE — TELEPHONE ENCOUNTER
Rx done.  Please let patient know that she can come to clinic and pick it up.  Thanks.    Sid Pena MD

## 2022-12-30 NOTE — PROGRESS NOTES
Assessment & Plan     Atypical chest pain    Chest xray Negative for acute findings, read by Maximiliano WONG at time of visit.    EKG and troponin are NEG for cardiac event, MI  EKG and ESR neg for signs of pericarditsi  Troponin normal limits    - XR Chest 2 Views; Future  - EKG 12-lead complete w/read - Clinics  - ESR: Erythrocyte sedimentation rate; Future  - Troponin I; Future  - ESR: Erythrocyte sedimentation rate  - Troponin I    Chronic coughing    Chest xray Negative for acute findings, read by Maximiliano WONG at time of visit.    - XR Chest 2 Views; Future    Referral to Adult Pulmonology for further evaluation of chronic cough post covid  Tessalon for coughing  Tussionex prn coughing    - Adult Pulmonary Medicine Referral; Future  - azithromycin (ZITHROMAX) 250 MG tablet; Take 2 tablets (500 mg) by mouth daily for 1 day, THEN 1 tablet (250 mg) daily for 4 days.  - benzonatate (TESSALON) 200 MG capsule; Take 1 capsule (200 mg) by mouth 3 times daily as needed  - chlorpheniramine-HYDROcodone (TUSSIONEX) 10-8 MG/5ML suspension; Take 1-2 teaspoonful by mouth every 12 hours    COVID-19 long hauler manifesting chronic cough    - Adult Pulmonary Medicine Referral; Future  - albuterol (PROVENTIL HFA) 108 (90 Base) MCG/ACT inhaler; Inhale 2 puffs into the lungs every 6 hours  - predniSONE (DELTASONE) 20 MG tablet; 1 tab po bid for 5 days, then 1 tab po every day for 3 days, then 1/2 tab po every day for 2 days    Nausea    - ondansetron (ZOFRAN ODT) 4 MG ODT tab; Take 1 tablet (4 mg) by mouth every 8 hours as needed for nausea    Review of external notes as documented elsewhere in note         CONSULTATION/REFERRAL to Pulmonology     No follow-ups on file.    Maximiliano Feliz, MMS, PALC  [facility] AARON Shelton is a 77 year old, presenting for the following health issues:  Urgent Care (SINUS WAS GIVEN ANTIBIOTICS WITH NO RELIEF)      HPI   Review of Systems    Constitutional, HEENT, cardiovascular, pulmonary, GI, , musculoskeletal, neuro, skin, endocrine and psych systems are negative, except as otherwise noted.      Objective    BP (!) 156/78 (BP Location: Right arm, Patient Position: Sitting, Cuff Size: Adult Regular)   Pulse 103   Temp 99  F (37.2  C) (Oral)   SpO2 98%   There is no height or weight on file to calculate BMI.  Physical Exam   GENERAL: healthy, alert and no distress  EYES: Eyes grossly normal to inspection, PERRL and conjunctivae and sclerae normal  HENT: ear canals and TM's normal, nose and mouth without ulcers or lesions  NECK: no adenopathy, no asymmetry, masses, or scars and thyroid normal to palpation  RESP: lungs clear to auscultation - no rales, rhonchi or wheezes  CV: regular rate and rhythm, normal S1 S2, no S3 or S4, no murmur, click or rub, no peripheral edema and peripheral pulses strong  ABDOMEN: soft, nontender, no hepatosplenomegaly, no masses and bowel sounds normal  MS: no gross musculoskeletal defects noted, no edema  SKIN: no suspicious lesions or rashes  NEURO: Normal strength and tone, mentation intact and speech normal  PSYCH: mentation appears normal, affect normal/bright        Results for orders placed or performed in visit on 12/29/22   Comprehensive metabolic panel (BMP + Alb, Alk Phos, ALT, AST, Total. Bili, TP)     Status: Abnormal   Result Value Ref Range    Sodium 141 133 - 144 mmol/L    Potassium 4.5 3.4 - 5.3 mmol/L    Chloride 103 94 - 109 mmol/L    Carbon Dioxide (CO2) 31 20 - 32 mmol/L    Anion Gap 7 3 - 14 mmol/L    Urea Nitrogen 17 7 - 30 mg/dL    Creatinine 1.00 0.52 - 1.04 mg/dL    Calcium 9.5 8.5 - 10.1 mg/dL    Glucose 175 (H) 70 - 99 mg/dL    Alkaline Phosphatase 100 40 - 150 U/L    AST 30 0 - 45 U/L    ALT 28 0 - 50 U/L    Protein Total 7.7 6.8 - 8.8 g/dL    Albumin 3.8 3.4 - 5.0 g/dL    Bilirubin Total 0.8 0.2 - 1.3 mg/dL    GFR Estimate 57 (L) >60 mL/min/1.73m2   ESR: Erythrocyte sedimentation rate      Status: Normal   Result Value Ref Range    Erythrocyte Sedimentation Rate 19 0 - 30 mm/hr   Troponin I     Status: Normal   Result Value Ref Range    Troponin I High Sensitivity 5 <54 ng/L   CBC with platelets and differential     Status: None   Result Value Ref Range    WBC Count 7.7 4.0 - 11.0 10e3/uL    RBC Count 4.42 3.80 - 5.20 10e6/uL    Hemoglobin 13.5 11.7 - 15.7 g/dL    Hematocrit 41.7 35.0 - 47.0 %    MCV 94 78 - 100 fL    MCH 30.5 26.5 - 33.0 pg    MCHC 32.4 31.5 - 36.5 g/dL    RDW 14.9 10.0 - 15.0 %    Platelet Count 332 150 - 450 10e3/uL    % Neutrophils 52 %    % Lymphocytes 33 %    % Monocytes 11 %    % Eosinophils 4 %    % Basophils 0 %    Absolute Neutrophils 4.0 1.6 - 8.3 10e3/uL    Absolute Lymphocytes 2.5 0.8 - 5.3 10e3/uL    Absolute Monocytes 0.8 0.0 - 1.3 10e3/uL    Absolute Eosinophils 0.3 0.0 - 0.7 10e3/uL    Absolute Basophils 0.0 0.0 - 0.2 10e3/uL   CBC with platelets and differential     Status: None    Narrative    The following orders were created for panel order CBC with platelets and differential.  Procedure                               Abnormality         Status                     ---------                               -----------         ------                     CBC with platelets and d...[[id number]]                      Final result                 Please view results for these tests on the individual orders.

## 2022-12-30 NOTE — TELEPHONE ENCOUNTER
[facility] pharmacist called reports they can't order chlorpheniramine-HYDROcodone (TUSSIONEX) 10-8 MG/5ML suspension.    Pharmacist request alternative Rx. Please advice.     Replacement rx needs to be sent to [facility] pharmacy.

## 2022-12-30 NOTE — TELEPHONE ENCOUNTER
I called pharmacy -- only alternative is a standard guaifenesin - codeine preparation.  The patient does have indications of some prior GI intolerance with codeine.  Based upon the notes from yesterday's visit, it is not clear that treating the patient with an opioid-containing cough suppressant is clearly in her best interest (risk of side effects or adverse effects may outweigh the therapeutic benefit for a strictly symptom-limiting therapy which will not clearly modify the underlying pathophysiology of the condition).      I called and left a message asking the patient for a call back to determine how strong her need for this treatment is given that it is 50:50 in terms of efficacy and that there may also be some harms of using this therapy.    Sid Pena MD

## 2022-12-30 NOTE — TELEPHONE ENCOUNTER
Call back to clinic from patient. Pt states that the intolerance she has had in the past from morphine and codeine has been after surgical procedures. Pt states that she has taken Robitussin AC in the past and has tolerated it well. Pt understands risk of side effects and would like to move forward with Rx to help alleviated her sx. Pt verbalized understanding that medications would not modify the underlying pathophysiology of the condition.     Routing to UC provider.     Valentino Ramirez RN

## 2023-10-27 ENCOUNTER — OFFICE VISIT (OUTPATIENT)
Dept: URGENT CARE | Facility: URGENT CARE | Age: 78
End: 2023-10-27
Payer: COMMERCIAL

## 2023-10-27 ENCOUNTER — ANCILLARY PROCEDURE (OUTPATIENT)
Dept: GENERAL RADIOLOGY | Facility: CLINIC | Age: 78
End: 2023-10-27
Attending: FAMILY MEDICINE
Payer: COMMERCIAL

## 2023-10-27 VITALS
RESPIRATION RATE: 16 BRPM | OXYGEN SATURATION: 98 % | TEMPERATURE: 97.5 F | HEART RATE: 80 BPM | SYSTOLIC BLOOD PRESSURE: 160 MMHG | DIASTOLIC BLOOD PRESSURE: 90 MMHG

## 2023-10-27 DIAGNOSIS — S92.524A CLOSED NONDISPLACED FRACTURE OF MIDDLE PHALANX OF LESSER TOE OF RIGHT FOOT, INITIAL ENCOUNTER: Primary | ICD-10-CM

## 2023-10-27 DIAGNOSIS — S99.921A TOE INJURY, RIGHT, INITIAL ENCOUNTER: ICD-10-CM

## 2023-10-27 PROCEDURE — 99214 OFFICE O/P EST MOD 30 MIN: CPT | Performed by: FAMILY MEDICINE

## 2023-10-27 PROCEDURE — 73660 X-RAY EXAM OF TOE(S): CPT | Mod: TC | Performed by: RADIOLOGY

## 2023-10-27 NOTE — PROGRESS NOTES
SUBJECTIVE:  Chief Complaint   Patient presents with    Toe Pain     Right small toe pain for the last week. Drop something on it last week.    .ident presents with a chief complaint of right toe(s) fifth.  The injury occurred 2 weeks ago.   The injury happened while while walking.   How: trauma: immediate pain    History reviewed. No pertinent past medical history.  Allergies   Allergen Reactions    Codeine      Other reaction(s): GI intolerance    Morphine      Other reaction(s): GI intolerance  Other reaction(s): GI Upset  Other reaction(s): GI intolerance    Levofloxacin Swelling and Rash     Social History     Tobacco Use    Smoking status: Never    Smokeless tobacco: Never   Substance Use Topics    Alcohol use: Not on file       ROSINTEGUMENTARY/SKIN: NEGATIVE for open wound/bleeding and NEGATIVE for bruising    EXAM: BP (!) 160/90 (BP Location: Right arm, Patient Position: Sitting, Cuff Size: Adult Regular)   Pulse 80   Temp 97.5  F (36.4  C) (Oral)   Resp 16   SpO2 98% Gen: healthy,alert,no distress  Extremity: toe has pain with palpation and rom.   There is not compromise to the distal circulation.  Pulses are +2 and CRT is brisk.GENERAL APPEARANCE: healthy, alert and no distress  EXTREMITIES: peripheral pulses normal  SKIN: no suspicious lesions or rashes  NEURO: Normal strength and tone, sensory exam grossly normal, mentation intact and speech normal    X-RAY with toe fx, rt 5th read by Dr. Nirav Carlson      ICD-10-CM    1. Closed nondisplaced fracture of middle phalanx of lesser toe of right foot, initial encounter  S92.524A Ankle/Foot Bracing Supplies DME Post-op Shoe; Right     Orthopedic  Referral      2. Toe injury, right, initial encounter  S99.921A XR Toe Right G/E 2 Views     Ankle/Foot Bracing Supplies DME Post-op Shoe; Right     Orthopedic  Referral        RICE

## 2024-03-09 ENCOUNTER — HEALTH MAINTENANCE LETTER (OUTPATIENT)
Age: 79
End: 2024-03-09

## 2024-10-07 ENCOUNTER — OFFICE VISIT (OUTPATIENT)
Dept: URGENT CARE | Facility: URGENT CARE | Age: 79
End: 2024-10-07
Payer: COMMERCIAL

## 2024-10-07 VITALS
HEART RATE: 80 BPM | TEMPERATURE: 98 F | SYSTOLIC BLOOD PRESSURE: 148 MMHG | RESPIRATION RATE: 16 BRPM | OXYGEN SATURATION: 95 % | DIASTOLIC BLOOD PRESSURE: 84 MMHG

## 2024-10-07 DIAGNOSIS — N39.0 URINARY TRACT INFECTION WITH HEMATURIA, SITE UNSPECIFIED: ICD-10-CM

## 2024-10-07 DIAGNOSIS — R07.0 THROAT PAIN: ICD-10-CM

## 2024-10-07 DIAGNOSIS — H61.22 IMPACTED CERUMEN OF LEFT EAR: ICD-10-CM

## 2024-10-07 DIAGNOSIS — H61.21 IMPACTED CERUMEN OF RIGHT EAR: ICD-10-CM

## 2024-10-07 DIAGNOSIS — R31.9 URINARY TRACT INFECTION WITH HEMATURIA, SITE UNSPECIFIED: ICD-10-CM

## 2024-10-07 DIAGNOSIS — R30.0 DYSURIA: Primary | ICD-10-CM

## 2024-10-07 LAB
ALBUMIN UR-MCNC: 30 MG/DL
APPEARANCE UR: CLEAR
BACTERIA #/AREA URNS HPF: ABNORMAL /HPF
BILIRUB UR QL STRIP: NEGATIVE
COLOR UR AUTO: YELLOW
DEPRECATED S PYO AG THROAT QL EIA: NEGATIVE
GLUCOSE UR STRIP-MCNC: NEGATIVE MG/DL
HGB UR QL STRIP: ABNORMAL
KETONES UR STRIP-MCNC: NEGATIVE MG/DL
LEUKOCYTE ESTERASE UR QL STRIP: ABNORMAL
NITRATE UR QL: NEGATIVE
PH UR STRIP: 6 [PH] (ref 5–7)
RBC #/AREA URNS AUTO: >100 /HPF
SP GR UR STRIP: 1.02 (ref 1–1.03)
SQUAMOUS #/AREA URNS AUTO: ABNORMAL /LPF
UROBILINOGEN UR STRIP-ACNC: 0.2 E.U./DL
WBC #/AREA URNS AUTO: >100 /HPF

## 2024-10-07 PROCEDURE — 87651 STREP A DNA AMP PROBE: CPT | Performed by: PHYSICIAN ASSISTANT

## 2024-10-07 PROCEDURE — 99214 OFFICE O/P EST MOD 30 MIN: CPT | Mod: 25 | Performed by: PHYSICIAN ASSISTANT

## 2024-10-07 PROCEDURE — 81001 URINALYSIS AUTO W/SCOPE: CPT | Performed by: PHYSICIAN ASSISTANT

## 2024-10-07 PROCEDURE — 87088 URINE BACTERIA CULTURE: CPT | Performed by: PHYSICIAN ASSISTANT

## 2024-10-07 PROCEDURE — 69209 REMOVE IMPACTED EAR WAX UNI: CPT | Mod: 50 | Performed by: PHYSICIAN ASSISTANT

## 2024-10-07 PROCEDURE — 87186 SC STD MICRODIL/AGAR DIL: CPT | Performed by: PHYSICIAN ASSISTANT

## 2024-10-07 PROCEDURE — 87086 URINE CULTURE/COLONY COUNT: CPT | Performed by: PHYSICIAN ASSISTANT

## 2024-10-07 RX ORDER — CEFDINIR 300 MG/1
300 CAPSULE ORAL 2 TIMES DAILY
Qty: 14 CAPSULE | Refills: 0 | Status: SHIPPED | OUTPATIENT
Start: 2024-10-07 | End: 2024-10-14

## 2024-10-07 NOTE — PROGRESS NOTES
Assessment & Plan     Dysuria    UA is pos  Urine culture pending    - UA with Microscopic reflex to Culture - Clinic Collect  - UA Microscopic with Reflex to Culture  - Urine Culture  - cefdinir (OMNICEF) 300 MG capsule  Dispense: 14 capsule; Refill: 0    Urinary tract infection with hematuria, site unspecified    You have been diagnosed with a UTI.  This is one of the most common infections in women because women have a shorter urethra than men. Bacteria have a shorter distance to travel to reach the bladder.. Women who have gone through menopause also lose the protection from estrogen that lowers the chance of getting a UTI. And some women are at higher risk because of their genes. The most common cause of bladder infections is bacteria from the bowels. The bacteria get onto the skin around the opening of the urethra. From there, they can get into the urine. Then they travel up to the bladder, causing inflammation and infection.  Make sure you urinate after sex, drink plenty of fluids and do not hold in your urine.  We have started you on antibiotics for infection and we are awaiting urine culture results, if there is antibiotic resistance on the culture we will call you and switch antibiotics.     - cefdinir (OMNICEF) 300 MG capsule  Dispense: 14 capsule; Refill: 0    Throat pain    Strep is neg    You had a strep test done today that was neg.  We will perform a strep culture and if any positive results come back we will call you and call in antibiotics.  At this time, this appears to be a viral infection and requires symptomatic treatment.  Most viral sore throats will resolve with in a few days.  If your throat pain worsens then please be  rechecked in the UC or by your PCP.    - Streptococcus A Rapid Screen w/Reflex to PCR  - Group A Streptococcus PCR Throat Swab    Impacted cerumen of left ear    PROCEDURE:    Ear was evaluated and found to have impacted wax  Ear irrigation was performed and was was  removed  Patient tolerated procedure well  May use OTC debrox drops prn in the future      Impacted earwax is a buildup of the natural wax in the ear. Tiny glands in your ear make substances that combine with dead skin cells to form earwax. Earwax helps protect your ear canal from water, dirt, infection, and injury. Over time, earwax travels from the inner part of your ear canal to the entrance of the canal. Then it falls away naturally. But in some cases, it can t travel to the entrance of the canal. This may be because of a health condition or objects put in the ear. With age, earwax tends to become harder and less fluid.  Placing objects in ear like ear buds or using q-tips can cause wax to build up in the ears.     - CO REMOVAL IMPACTED CERUMEN IRRIGATION/LVG UNILAT    Impacted cerumen of right ear    PROCEDURE:    Ear was evaluated and found to have impacted wax  Ear irrigation was performed and was was removed  Patient tolerated procedure well  May use OTC debrox drops prn in the future      Impacted earwax is a buildup of the natural wax in the ear. Tiny glands in your ear make substances that combine with dead skin cells to form earwax. Earwax helps protect your ear canal from water, dirt, infection, and injury. Over time, earwax travels from the inner part of your ear canal to the entrance of the canal. Then it falls away naturally. But in some cases, it can t travel to the entrance of the canal. This may be because of a health condition or objects put in the ear. With age, earwax tends to become harder and less fluid.  Placing objects in ear like ear buds or using q-tips can cause wax to build up in the ears.     - CO REMOVAL IMPACTED CERUMEN IRRIGATION/LVG UNILAT         No follow-ups on file.    Maximiliano Feliz, [facility], PA-C  M [facility]    Caterina Shelton is a 79 year old female who presents to clinic today for the following health issues:  Chief Complaint   Patient presents  with    UTI     Hematuria, dysuria, urinary urgency and frequency which started today        HPI  Review of Systems  Constitutional, HEENT, cardiovascular, pulmonary, gi and gu systems are negative, except as otherwise noted.      Objective    BP (!) 148/84 (BP Location: Right arm, Patient Position: Sitting, Cuff Size: Adult Large)   Pulse 80   Temp 98  F (36.7  C) (Oral)   Resp 16   SpO2 95%   Physical Exam   GENERAL: alert and no distress  EYES: Eyes grossly normal to inspection, PERRL and conjunctivae and sclerae normal  HENT: normal cephalic/atraumatic, both ears: occluded with wax, nose and mouth without ulcers or lesions, oropharynx clear, and oral mucous membranes moist  NECK: no adenopathy, no asymmetry, masses, or scars  RESP: lungs clear to auscultation - no rales, rhonchi or wheezes  CV: regular rate and rhythm, normal S1 S2, no S3 or S4, no murmur, click or rub, no peripheral edema  MS: no gross musculoskeletal defects noted, no edema  SKIN: no suspicious lesions or rashes  NEURO: Normal strength and tone, mentation intact and speech normal  PSYCH: mentation appears normal, affect normal/bright      Results for orders placed or performed in visit on 10/07/24   UA with Microscopic reflex to Culture - Clinic Collect     Status: Abnormal    Specimen: Urine, Midstream   Result Value Ref Range    Color Urine Yellow Colorless, Straw, Light Yellow, Yellow    Appearance Urine Clear Clear    Glucose Urine Negative Negative mg/dL    Bilirubin Urine Negative Negative    Ketones Urine Negative Negative mg/dL    Specific Gravity Urine 1.025 1.003 - 1.035    Blood Urine Large (A) Negative    pH Urine 6.0 5.0 - 7.0    Protein Albumin Urine 30 (A) Negative mg/dL    Urobilinogen Urine 0.2 0.2, 1.0 E.U./dL    Nitrite Urine Negative Negative    Leukocyte Esterase Urine Large (A) Negative   UA Microscopic with Reflex to Culture     Status: Abnormal   Result Value Ref Range    Bacteria Urine Moderate (A) None Seen /HPF     RBC Urine >100 (A) 0-2 /HPF /HPF    WBC Urine >100 (A) 0-5 /HPF /HPF    Squamous Epithelials Urine Few (A) None Seen /LPF   Streptococcus A Rapid Screen w/Reflex to PCR     Status: Normal    Specimen: Throat; Swab   Result Value Ref Range    Group A Strep antigen Negative Negative

## 2024-10-08 LAB
BACTERIA UR CULT: ABNORMAL
BACTERIA UR CULT: ABNORMAL
GROUP A STREP BY PCR: NOT DETECTED

## 2025-03-16 ENCOUNTER — HEALTH MAINTENANCE LETTER (OUTPATIENT)
Age: 80
End: 2025-03-16